# Patient Record
Sex: MALE | Employment: FULL TIME | URBAN - METROPOLITAN AREA
[De-identification: names, ages, dates, MRNs, and addresses within clinical notes are randomized per-mention and may not be internally consistent; named-entity substitution may affect disease eponyms.]

---

## 2017-01-25 ENCOUNTER — HOSPITAL ENCOUNTER (OUTPATIENT)
Dept: GENERAL RADIOLOGY | Age: 22
Discharge: HOME | End: 2017-01-25

## 2017-01-25 DIAGNOSIS — M54.50 LOW BACK PAIN: ICD-10-CM

## 2017-01-25 PROCEDURE — 72080 X-RAY EXAM THORACOLMB 2/> VW: CPT

## 2018-09-29 ENCOUNTER — APPOINTMENT (OUTPATIENT)
Dept: CT IMAGING | Age: 23
DRG: 103 | End: 2018-09-29
Attending: INTERNAL MEDICINE
Payer: COMMERCIAL

## 2018-09-29 ENCOUNTER — HOSPITAL ENCOUNTER (INPATIENT)
Age: 23
LOS: 1 days | Discharge: HOME | DRG: 103 | End: 2018-09-30
Attending: STUDENT IN AN ORGANIZED HEALTH CARE EDUCATION/TRAINING PROGRAM | Admitting: INTERNAL MEDICINE
Payer: COMMERCIAL

## 2018-09-29 ENCOUNTER — APPOINTMENT (OUTPATIENT)
Dept: MRI IMAGING | Age: 23
DRG: 103 | End: 2018-09-29
Attending: INTERNAL MEDICINE
Payer: COMMERCIAL

## 2018-09-29 ENCOUNTER — APPOINTMENT (OUTPATIENT)
Dept: CT IMAGING | Age: 23
DRG: 103 | End: 2018-09-29
Attending: STUDENT IN AN ORGANIZED HEALTH CARE EDUCATION/TRAINING PROGRAM
Payer: COMMERCIAL

## 2018-09-29 DIAGNOSIS — G45.9 TRANSIENT CEREBRAL ISCHEMIA, UNSPECIFIED TYPE: Primary | ICD-10-CM

## 2018-09-29 DIAGNOSIS — G11.0: ICD-10-CM

## 2018-09-29 DIAGNOSIS — R53.1 WEAKNESS: ICD-10-CM

## 2018-09-29 DIAGNOSIS — R29.810 FACIAL WEAKNESS: ICD-10-CM

## 2018-09-29 DIAGNOSIS — R20.0 LEFT SIDED NUMBNESS: ICD-10-CM

## 2018-09-29 DIAGNOSIS — G43.109 COMPLICATED MIGRAINE: ICD-10-CM

## 2018-09-29 LAB
ALBUMIN SERPL-MCNC: 4.5 G/DL (ref 3.5–5)
ALBUMIN/GLOB SERPL: 1.2 {RATIO} (ref 1.1–2.2)
ALP SERPL-CCNC: 78 U/L (ref 45–117)
ALT SERPL-CCNC: 33 U/L (ref 12–78)
ANION GAP SERPL CALC-SCNC: 7 MMOL/L (ref 5–15)
AST SERPL-CCNC: 18 U/L (ref 15–37)
ATRIAL RATE: 88 BPM
BASOPHILS # BLD: 0 K/UL (ref 0–0.1)
BASOPHILS NFR BLD: 0 % (ref 0–1)
BILIRUB SERPL-MCNC: 0.6 MG/DL (ref 0.2–1)
BUN SERPL-MCNC: 16 MG/DL (ref 6–20)
BUN/CREAT SERPL: 17 (ref 12–20)
CALCIUM SERPL-MCNC: 8.9 MG/DL (ref 8.5–10.1)
CALCULATED P AXIS, ECG09: 49 DEGREES
CALCULATED R AXIS, ECG10: 84 DEGREES
CALCULATED T AXIS, ECG11: 43 DEGREES
CHLORIDE SERPL-SCNC: 106 MMOL/L (ref 97–108)
CO2 SERPL-SCNC: 29 MMOL/L (ref 21–32)
COMMENT, HOLDF: NORMAL
CREAT SERPL-MCNC: 0.95 MG/DL (ref 0.7–1.3)
DIAGNOSIS, 93000: NORMAL
DIFFERENTIAL METHOD BLD: ABNORMAL
EOSINOPHIL # BLD: 0.2 K/UL (ref 0–0.4)
EOSINOPHIL NFR BLD: 4 % (ref 0–7)
ERYTHROCYTE [DISTWIDTH] IN BLOOD BY AUTOMATED COUNT: 11.9 % (ref 11.5–14.5)
GLOBULIN SER CALC-MCNC: 3.7 G/DL (ref 2–4)
GLUCOSE BLD STRIP.AUTO-MCNC: 106 MG/DL (ref 65–100)
GLUCOSE SERPL-MCNC: 103 MG/DL (ref 65–100)
HCT VFR BLD AUTO: 45.4 % (ref 36.6–50.3)
HGB BLD-MCNC: 15.5 G/DL (ref 12.1–17)
IMM GRANULOCYTES # BLD: 0 K/UL (ref 0–0.04)
IMM GRANULOCYTES NFR BLD AUTO: 1 % (ref 0–0.5)
INR PPP: 1 (ref 0.9–1.1)
LYMPHOCYTES # BLD: 1.5 K/UL (ref 0.8–3.5)
LYMPHOCYTES NFR BLD: 26 % (ref 12–49)
MCH RBC QN AUTO: 29.9 PG (ref 26–34)
MCHC RBC AUTO-ENTMCNC: 34.1 G/DL (ref 30–36.5)
MCV RBC AUTO: 87.5 FL (ref 80–99)
MONOCYTES # BLD: 0.6 K/UL (ref 0–1)
MONOCYTES NFR BLD: 11 % (ref 5–13)
NEUTS SEG # BLD: 3.3 K/UL (ref 1.8–8)
NEUTS SEG NFR BLD: 59 % (ref 32–75)
NRBC # BLD: 0 K/UL (ref 0–0.01)
NRBC BLD-RTO: 0 PER 100 WBC
P-R INTERVAL, ECG05: 108 MS
PLATELET # BLD AUTO: 219 K/UL (ref 150–400)
PMV BLD AUTO: 9.2 FL (ref 8.9–12.9)
POTASSIUM SERPL-SCNC: 3.7 MMOL/L (ref 3.5–5.1)
PROT SERPL-MCNC: 8.2 G/DL (ref 6.4–8.2)
PROTHROMBIN TIME: 9.8 SEC (ref 9–11.1)
Q-T INTERVAL, ECG07: 330 MS
QRS DURATION, ECG06: 104 MS
QTC CALCULATION (BEZET), ECG08: 399 MS
RBC # BLD AUTO: 5.19 M/UL (ref 4.1–5.7)
SAMPLES BEING HELD,HOLD: NORMAL
SERVICE CMNT-IMP: ABNORMAL
SODIUM SERPL-SCNC: 142 MMOL/L (ref 136–145)
VENTRICULAR RATE, ECG03: 88 BPM
WBC # BLD AUTO: 5.7 K/UL (ref 4.1–11.1)

## 2018-09-29 PROCEDURE — 74011636320 HC RX REV CODE- 636/320: Performed by: INTERNAL MEDICINE

## 2018-09-29 PROCEDURE — 82962 GLUCOSE BLOOD TEST: CPT

## 2018-09-29 PROCEDURE — 80053 COMPREHEN METABOLIC PANEL: CPT | Performed by: STUDENT IN AN ORGANIZED HEALTH CARE EDUCATION/TRAINING PROGRAM

## 2018-09-29 PROCEDURE — 95816 EEG AWAKE AND DROWSY: CPT | Performed by: INTERNAL MEDICINE

## 2018-09-29 PROCEDURE — 93005 ELECTROCARDIOGRAM TRACING: CPT

## 2018-09-29 PROCEDURE — 99285 EMERGENCY DEPT VISIT HI MDM: CPT

## 2018-09-29 PROCEDURE — 70551 MRI BRAIN STEM W/O DYE: CPT

## 2018-09-29 PROCEDURE — 85025 COMPLETE CBC W/AUTO DIFF WBC: CPT | Performed by: STUDENT IN AN ORGANIZED HEALTH CARE EDUCATION/TRAINING PROGRAM

## 2018-09-29 PROCEDURE — 70460 CT HEAD/BRAIN W/DYE: CPT

## 2018-09-29 PROCEDURE — 70544 MR ANGIOGRAPHY HEAD W/O DYE: CPT

## 2018-09-29 PROCEDURE — 85610 PROTHROMBIN TIME: CPT | Performed by: STUDENT IN AN ORGANIZED HEALTH CARE EDUCATION/TRAINING PROGRAM

## 2018-09-29 PROCEDURE — 65660000000 HC RM CCU STEPDOWN

## 2018-09-29 PROCEDURE — 36415 COLL VENOUS BLD VENIPUNCTURE: CPT | Performed by: STUDENT IN AN ORGANIZED HEALTH CARE EDUCATION/TRAINING PROGRAM

## 2018-09-29 PROCEDURE — 70450 CT HEAD/BRAIN W/O DYE: CPT

## 2018-09-29 PROCEDURE — 74011000258 HC RX REV CODE- 258: Performed by: INTERNAL MEDICINE

## 2018-09-29 RX ORDER — SODIUM CHLORIDE 0.9 % (FLUSH) 0.9 %
5-10 SYRINGE (ML) INJECTION EVERY 8 HOURS
Status: DISCONTINUED | OUTPATIENT
Start: 2018-09-29 | End: 2018-09-30 | Stop reason: HOSPADM

## 2018-09-29 RX ORDER — SODIUM CHLORIDE 0.9 % (FLUSH) 0.9 %
10 SYRINGE (ML) INJECTION
Status: COMPLETED | OUTPATIENT
Start: 2018-09-29 | End: 2018-09-29

## 2018-09-29 RX ORDER — SODIUM CHLORIDE 0.9 % (FLUSH) 0.9 %
5-10 SYRINGE (ML) INJECTION AS NEEDED
Status: DISCONTINUED | OUTPATIENT
Start: 2018-09-29 | End: 2018-09-30 | Stop reason: HOSPADM

## 2018-09-29 RX ORDER — ONDANSETRON 4 MG/1
4 TABLET, ORALLY DISINTEGRATING ORAL
Status: DISCONTINUED | OUTPATIENT
Start: 2018-09-29 | End: 2018-09-30 | Stop reason: HOSPADM

## 2018-09-29 RX ORDER — DIAZEPAM 5 MG/1
10 TABLET ORAL
Status: DISCONTINUED | OUTPATIENT
Start: 2018-09-29 | End: 2018-09-30 | Stop reason: HOSPADM

## 2018-09-29 RX ORDER — ENOXAPARIN SODIUM 100 MG/ML
40 INJECTION SUBCUTANEOUS EVERY 24 HOURS
Status: DISCONTINUED | OUTPATIENT
Start: 2018-09-29 | End: 2018-09-30 | Stop reason: HOSPADM

## 2018-09-29 RX ADMIN — Medication 10 ML: at 16:00

## 2018-09-29 RX ADMIN — Medication 10 ML: at 19:23

## 2018-09-29 RX ADMIN — IOPAMIDOL 100 ML: 612 INJECTION, SOLUTION INTRAVENOUS at 19:23

## 2018-09-29 RX ADMIN — SODIUM CHLORIDE 100 ML: 900 INJECTION, SOLUTION INTRAVENOUS at 19:23

## 2018-09-29 NOTE — PROGRESS NOTES
Admission Medication Reconciliation: 
 
Information obtained from:  Patient Updated PTA meds/reviewed patient's allergies. Patient denies taking any prescription medications, just occasion ibuprofen as needed for headaches. Patient reported smoking THC daily and admits to drinking 6 beers a day. Allergies:  Review of patient's allergies indicates no known allergies. Significant PMH/Disease States:  
Past Medical History:  
Diagnosis Date  Alcohol use  Cerebral palsy ([facility] [address]  Depression  GI bleed Chief Complaint for this Admission: Chief Complaint Patient presents with  Fatigue  Dizziness Prior to Admission Medications:  
None

## 2018-09-29 NOTE — ED NOTES
1455: Report attempted x1, spoke to [employer]. 1500: TRANSFER - OUT REPORT: 
 
Verbal report given to TAMIKO Peter(name) on Aj Dunlap  being transferred to NEURO(unit) for routine progression of care Report consisted of patients Situation, Background, Assessment and  
Recommendations(SBAR). Information from the following report(s) SBAR, ED Summary, Intake/Output, MAR and Recent Results was reviewed with the receiving nurse. Lines:  
Peripheral IV 09/29/18 Left Antecubital (Active) Site Assessment Clean, dry, & intact 9/29/2018 10:23 AM  
Phlebitis Assessment 0 9/29/2018 10:23 AM  
Infiltration Assessment 0 9/29/2018 10:23 AM  
Dressing Status Clean, dry, & intact 9/29/2018 10:23 AM  
  
 
Opportunity for questions and clarification was provided. Patient transported with: 
 [employer]

## 2018-09-29 NOTE — PROGRESS NOTES
TRANSFER - IN REPORT: 
 
Verbal report received from Beni Matamoros (name) on Abelardo Cluster  being received from ED(unit) for routine progression of care Report consisted of patients Situation, Background, Assessment and  
Recommendations(SBAR). Information from the following report(s) SBAR, Kardex, [facility] and Cardiac Rhythm NSR was reviewed with the receiving nurse. Opportunity for questions and clarification was provided. Assessment completed upon patients arrival to unit and care assumed.

## 2018-09-29 NOTE — ED PROVIDER NOTES
HPI Comments: [place] y.o. male with past medical history significant for cerebral palsy who presents from home with chief complaint of numbness. Patient states that he developed left-sided facial numbness and droop as well as left upper extremity and left lower extremity numbness 16 hours ago (1700 yesterday evening). He complains of associated headache last night as well. Patient notes that he was unable to move this morning and had difficulty \"getting out of bed\" and \"putting his shoes on. \"  He notes that he previously had similar symptoms of numbness before in 7th grade. He comments that his symptoms have currently resolved. Patient denies fever, chills, nausea, vomiting, abdominal pain, chest pain, and diarrhea. There are no other acute medical concerns at this time. Social hx: unknown tobacco, alcohol, and drug use PCP: Suni Guerra MD 
 
Note written by Anthony Ceballos, as dictated by Shana Crain MD 9:12 AM 
 
 
 
 
The history is provided by the patient. No  was used. History reviewed. No pertinent past medical history. History reviewed. No pertinent surgical history. History reviewed. No pertinent family history. Social History Social History  Marital status: UNKNOWN Spouse name: N/A  
 Number of children: N/A  
 Years of education: N/A Occupational History  Not on file. Social History Main Topics  Smoking status: Not on file  Smokeless tobacco: Not on file  Alcohol use Not on file  Drug use: Not on file  Sexual activity: Not on file Other Topics Concern  Not on file Social History Narrative  No narrative on file ALLERGIES: Review of patient's allergies indicates no known allergies. Review of Systems Constitutional: Negative for chills, diaphoresis, fatigue and fever.   
HENT: Negative for congestion, rhinorrhea, sinus pressure, sore throat, trouble swallowing and voice change. Eyes: Negative for photophobia and visual disturbance. Respiratory: Negative for cough, chest tightness and shortness of breath. Cardiovascular: Negative for chest pain, palpitations and leg swelling. Gastrointestinal: Negative for abdominal pain, blood in stool, constipation, diarrhea, nausea and vomiting. Musculoskeletal: Negative for arthralgias, myalgias and neck pain. Neurological: Positive for facial asymmetry, numbness and headaches. Negative for dizziness, weakness and light-headedness. All other systems reviewed and are negative. Vitals:  
 09/29/18 9105 BP: 152/89 Pulse: 100 Resp: 16 Temp: 97.4 °F (36.3 °C) SpO2: 100% Height: 5' 11\" (1.803 m) Physical Exam  
 
MDM Number of Diagnoses or Management Options Transient cerebral ischemia, unspecified type:  
Diagnosis management comments: A/P:  TIA. Will admit for further eval. 
 
  
Amount and/or Complexity of Data Reviewed Clinical lab tests: ordered and reviewed Tests in the radiology section of CPT®: ordered and reviewed Discuss the patient with other providers: yes Independent visualization of images, tracings, or specimens: yes Risk of Complications, Morbidity, and/or Mortality Presenting problems: moderate Diagnostic procedures: moderate Management options: moderate Critical Care Total time providing critical care: 30-74 minutes (Total critical care time spent exclusive of procedures:  35 min.) Patient Progress Patient progress: stable ED Course Procedures ED EKG interpretation: 
Rhythm: sinus rhythm; Rate (approx.): 88 bpm; ST/T wave: No ST or T wave abnormalities; Note written by Talon Contreras, as dictated by Makayla Mallory MD 9:24 AM 
 
CONSULT NOTE: 
9:32 Jagruti Murguia MD spoke with Dr. Shant Zhou, Consult for Tele-Neurology.   Discussed available diagnostic tests and clinical findings. Dr. Melchor Moon states that the patient had a complex migraine or partial seizure and was postictal.  He doubts TIA but notes that the patient still needs to come in for evaluation. CONSULT NOTE: 
10:53 AM Sudha Kohli MD communicated with Dr. Deandra Yu, Consult for Hospitalist via [facility] Text. Discussed available diagnostic tests and clinical findings. Awaiting response from Dr. Deandra Yu. CONSULT NOTE: 
11:43 AM Sudha Kohli MD spoke with Dr. Leticia Aranda, Consult for Hospitalist.  Discussed available diagnostic tests and clinical findings. Dr. Cata Vazquez will admit the patient to the hospital. 
 
4:32 PM 
Patient is being admitted to the hospital.  The results of their tests and reasons for their admission have been discussed with them and/or available family. They convey agreement and understanding for the need to be admitted and for their admission diagnosis. Consultation has been made with the inpatient physician specialist for hospitalization. LABORATORY TESTS: 
No results found for this or any previous visit (from the past 12 hour(s)). IMAGING RESULTS: 
MRV BRAIN WO CONT Final Result IMPRESSION:  Diminutive left transverse sinus with apparent filling defects. It  
is unclear whether this represents dural venous sinus thrombosis or a right  
dominant transverse sinus system. If there is persistent clinical concern for  
dural venous sinus thrombosis, contrast-enhanced CTV Head is recommended for  
further evaluation. 23X Henrry Sydnie CT HEAD W CONT Final Result CT CODE NEURO HEAD WO CONTRAST Final Result No results found. MEDICATIONS GIVEN: 
Medications  
iopamidol (ISOVUE 300) 61 % contrast injection 100 mL (100 mL IntraVENous Given 9/29/18 1923)  
sodium chloride 0.9 % bolus infusion 100 mL (100 mL IntraVENous New Bag 9/29/18 1923)  
sodium chloride (NS) flush 10 mL (10 mL IntraVENous Given 9/29/18 1923) IMPRESSION: 
 1. Transient cerebral ischemia, unspecified type 2. Weakness 3. Complicated migraine 4. Facial weakness 5. Left sided numbness 6. Congenital nonprogressive ataxia ([facility] [address] PLAN: 
1. Admit to Hospitalist 
 
Total critical care time spent exclusive of procedures:  35 minutes Yuli Harris MD

## 2018-09-29 NOTE — H&P
History and Physical 
Primary Care Provider: Fabienne Dillard MD 
 
Subjective:  
 
Apolonia Julian is a 21 y.o. male  with spastic CP, h/o UGIB, depression, and alcohol abuse who presents with acute onset L sided weakness. He states he was playing video games ysterday evening when he suddenly felt a L facial droop, and LUE weakness. He ignored the symtoms and continued playing. Later that evening he began having a headache with photophobia and phonophobia. His headache and weakness have now resolved. 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 After he told her how he was feeling last night, she told him to come to the ED for evaluation. Per the patient he had a similar event occur when he was in the seventh grade, and was told he had a complex migraine. He does not take any medication. He should walk with a walker but doesn't like too, and states he has been falling and knows he probably should. Denies any visual changes, no hearing changes, no HA currently, no trouble swallowing, no N/V/D/C, no abdominal pain, no MSK pain, no weakness currently, no numbness or tingling. Drinks about 6 beers per day, and does state he has withdrawal symptoms. Last drink was 2 nights ago. Review of Systems: Further 10 point review of systems is benign. A comprehensive review of systems was negative except for that written in the History of Present Illness. Past Medical History:  
Diagnosis Date  Alcohol use  Cerebral palsy ([facility] [address]  Depression  GI bleed Past Surgical History:  
Procedure Laterality Date  HX TONSILLECTOMY Medications: no medications No Known Allergies History reviewed. No pertinent family history. SOCIAL HISTORY: 
Patient resides at Home. Patient ambulates with walker, but chooses not to use it. Smoking history: 1 pack per week of cigarettes Alcohol history: 6 beers per day, per patient has had withdrawal in the past. 
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  
 
 
Physical Exam:  
Visit Vitals  /57 (BP 1 Location: Right arm, BP Patient Position: At rest)  Pulse 84  Temp 97.4 °F (36.3 °C)  Resp 21  
 Ht 5' 11\" (1.803 m)  SpO2 96% General appearance: alert, cooperative, no distress, appears stated age Eyes: conjunctivae/corneas clear. PERRL, EOM's intact.+ bilateral horizontal nystagmus Throat: Lips, mucosa, and tongue normal. Poor dentition Neck: supple, symmetrical, trachea midline, no adenopathy and no carotid bruit Lungs: clear to auscultation bilaterally Heart: regular rate and rhythm, S1, S2 normal, no murmur, click, rub or gallop Abdomen: soft, non-tender. Bowel sounds normal. No masses,  no organomegaly Extremities: extremities normal, atraumatic, no cyanosis or edema Pulses: 2+ and symmetric Skin: Skin color, texture, turgor normal. No rashes or lesions ECG:  normal EKG, normal sinus rhythm Data Review: All diagnostic labs and studies have been reviewed. CT head - no acute abnormalities Assessment:  
 
Active Problems: 
  Weakness (9/29/2018) Plan:  
Weakness and Headache - consistent with complex migraine headache. Has now resolved. H/O CP does put the patient at risk for seizure disorder. Unlikely to be TIA 
- Neurology consult - MRA/MRI, EEG  
- Given episodes are very infrequent, likely does not need any controller medications for headache 
- Consider rescue triptan for future. Spastic CP - with frequent falls - PT 
 
H/o acohol abuse 
- CIWA 
- Diazepam PRN  
 
DVT PPx - lovenox Regular diet Dispo: pending workup as above. Signed By: Tatiana Rivear MD   
 September 29, 2018

## 2018-09-29 NOTE — PROGRESS NOTES
Bedside and Verbal shift change report given to Harsh Khan (oncoming nurse) by Lakshmi Angel (offgoing nurse). Report included the following information SBAR, Kardex, MAR and Cardiac Rhythm NSR.

## 2018-09-29 NOTE — IP AVS SNAPSHOT
[address] [facility] [address] 
[phone] Patient: Stephanie Oropeza MRN: [id number] KUQ:[birth year] About your hospitalization You were admitted on:  September 29, 2018 You last received care in the:  [facility] 6S NEURO-SCI TELE You were discharged on:  September 30, 2018 Why you were hospitalized Your primary diagnosis was:  Not on File Your diagnoses also included:  Weakness Follow-up Information Follow up With Details Comments Contact Info Jacquie Stoddard MD Schedule an appointment as soon as possible for a visit  [facility] [address] [place] [address] 
[phone] Efrain Jimenez MD Schedule an appointment as soon as possible for a visit  [address] [facility] [address] [facility] [address] 
[phone] Discharge Orders None A check shailesh indicates which time of day the medication should be taken. My Medications START taking these medications Instructions Each Dose to Equal  
 Morning Noon Evening Bedtime  
 aspirin 81 mg chewable tablet Start taking on:  10/1/2018 Your last dose was: Your next dose is: Take 1 Tab by mouth daily. 81 mg  
    
   
   
   
  
 verapamil  mg tablet Commonly known as:  CALAN-SR Your last dose was: Your next dose is: Take 1 Tab by mouth daily. 120 mg Where to Get Your Medications Information on where to get these meds will be given to you by the nurse or doctor. ! Ask your nurse or doctor about these medications  
  aspirin 81 mg chewable tablet  
 verapamil  mg tablet Discharge Instructions Migraine Headache: Care Instructions Your Care Instructions Migraines are painful, throbbing headaches that often start on one side of the head. They may cause nausea and vomiting and make you sensitive to light, sound, or smell. Without treatment, migraines can last from 4 hours to a few days. Medicines can help prevent migraines or stop them after they have started. Your doctor can help you find which ones work best for you. Follow-up care is a key part of your treatment and safety. Be sure to make and go to all appointments, and call your doctor if you are having problems. It's also a good idea to know your test results and keep a list of the medicines you take. How can you care for yourself at home? · Do not drive if you have taken a prescription pain medicine. · Rest in a quiet, dark room until your headache is gone. Close your eyes, and try to relax or go to sleep. Don't watch TV or read. · Put a cold, moist cloth or cold pack on the painful area for 10 to 20 minutes at a time. Put a thin cloth between the cold pack and your skin. · Use a warm, moist towel or a heating pad set on low to relax tight shoulder and neck muscles. · Have someone gently massage your neck and shoulders. · Take your medicines exactly as prescribed. Call your doctor if you think you are having a problem with your medicine. You will get more details on the specific medicines your doctor prescribes. · Be careful not to take pain medicine more often than the instructions allow. You could get worse or more frequent headaches when the medicine wears off. To prevent migraines · Keep a headache diary so you can figure out what triggers your headaches. Avoiding triggers may help you prevent headaches. Record when each headache began, how long it lasted, and what the pain was like. (Was it throbbing, aching, stabbing, or dull?) Write down any other symptoms you had with the headache, such as nausea, flashing lights or dark spots, or sensitivity to bright light or loud noise. Note if the headache occurred near your period. List anything that might have triggered the headache.  Triggers may include certain foods (chocolate, cheese, wine) or odors, smoke, bright light, stress, or lack of sleep. · If your doctor has prescribed medicine for your migraines, take it as directed. You may have medicine that you take only when you get a migraine and medicine that you take all the time to help prevent migraines. ¨ If your doctor has prescribed medicine for when you get a headache, take it at the first sign of a migraine, unless your doctor has given you other instructions. ¨ If your doctor has prescribed medicine to prevent migraines, take it exactly as prescribed. Call your doctor if you think you are having a problem with your medicine. · Find healthy ways to deal with stress. Migraines are most common during or right after stressful times. Take time to relax before and after you do something that has caused a migraine in the past. 
· Try to keep your muscles relaxed by keeping good posture. Check your jaw, face, neck, and shoulder muscles for tension. Try to relax them. When you sit at a desk, change positions often. And make sure to stretch for 30 seconds each hour. · Get plenty of sleep and exercise. · Eat meals on a regular schedule. Avoid foods and drinks that often trigger migraines. These include chocolate, alcohol (especially red wine and port), aspartame, monosodium glutamate (MSG), and some additives found in foods (such as hot dogs, ferris, cold cuts, aged cheeses, and pickled foods). · Limit caffeine. Don't drink too much coffee, tea, or soda. But don't quit caffeine suddenly. That can also give you migraines. · Do not smoke or allow others to smoke around you. If you need help quitting, talk to your doctor about stop-smoking programs and medicines. These can increase your chances of quitting for good. · If you are taking birth control pills or hormone therapy, talk to your doctor about whether they are triggering your migraines. When should you call for help? Call 911 anytime you think you may need emergency care. For example, call if:    · You have signs of a stroke. These may include: 
¨ Sudden numbness, paralysis, or weakness in your face, arm, or leg, especially on only one side of your body. ¨ Sudden vision changes. ¨ Sudden trouble speaking. ¨ Sudden confusion or trouble understanding simple statements. ¨ Sudden problems with walking or balance. ¨ A sudden, severe headache that is different from past headaches.  
 Call your doctor now or seek immediate medical care if: 
   · You have new or worse nausea and vomiting.  
   · You have a new or higher fever.  
   · Your headache gets much worse.  
 Watch closely for changes in your health, and be sure to contact your doctor if: 
   · You are not getting better after 2 days (48 hours). Where can you learn more? Go to http://manuela-santiago.info/. Enter G566 in the search box to learn more about \"Migraine Headache: Care Instructions. \" Current as of: October 9, 2017 Content Version: 11.7 © 8089-1111 [employer] Care instructions adapted under license by [employer] (which disclaims liability or warranty for this information). If you have questions about a medical condition or this instruction, always ask your healthcare professional. [place] [address] any warranty or liability for your use of this information. 
  
 
 
  
  
  
Introducing [facility]! [facility] introduces Ampl[employer] patient portal. Now you can access parts of your medical record, email your doctor's office, and request medication refills online. 1. In your internet browser, go to [email or web address] 2. Click on the First Time User? Click Here link in the Sign In box. You will see the New Member Sign Up page. 3. Enter your [employer] Access Code exactly as it appears below. You will not need to use this code after youve completed the sign-up process. If you do not sign up before the expiration date, you must request a new code. · Spowit Access Code: [email or web address] Expires: 12/28/2018  9:03 AM 
 
4. Enter the last four digits of your Social Security Number (xxxx) and Date of Birth (mm/dd/yyyy) as indicated and click Submit. You will be taken to the next sign-up page. 5. Create a Spowit ID. This will be your Spowit login ID and cannot be changed, so think of one that is secure and easy to remember. 6. Create a Spowit password. You can change your password at any time. 7. Enter your Password Reset Question and Answer. This can be used at a later time if you forget your password. 8. Enter your e-mail address. You will receive e-mail notification when new information is available in [address] 9. Click Sign Up. You can now view and download portions of your medical record. 10. Click the Download Summary menu link to download a portable copy of your medical information. If you have questions, please visit the Frequently Asked Questions section of the Spowit website. Remember, Spowit is NOT to be used for urgent needs. For medical emergencies, dial 911. Now available from your iPhone and Android! Introducing Burke Huizar As a [facility] patient, I wanted to make you aware of our electronic visit tool called Burke Vicолег. [facility] 24/7 allows you to connect within minutes with a medical provider 24 hours a day, seven days a week via a mobile device or tablet or logging into a secure website from your computer. You can access Burke Gaye from anywhere in the [place]. A virtual visit might be right for you when you have a simple condition and feel like you just dont want to get out of bed, or cant get away from work for an appointment, when your regular [facility] provider is not available (evenings, weekends or holidays), or when youre out of town and need minor care.   Electronic visits cost only $49 and if the [facility] 24/7 provider determines a prescription is needed to treat your condition, one can be electronically transmitted to a nearby pharmacy*. Please take a moment to enroll today if you have not already done so. The enrollment process is free and takes just a few minutes. To enroll, please download the CME 24/7 soledad to your tablet or phone, or visit [email or web address] to enroll on your computer. And, as an [address] [place] [address] patient with a [employer] account, the results of your visits will be scanned into your electronic medical record and your primary care provider will be able to view the scanned results. We urge you to continue to see your regular CME provider for your ongoing medical care. And while your primary care provider may not be the one available when you seek a [employer] virtual visit, the peace of mind you get from getting a real diagnosis real time can be priceless. For more information on [employer]utenberg[employer], view our Frequently Asked Questions (FAQs) at [email or web address]. Sincerely, 
 
Susana Lopez MD 
Chief Medical Officer [facility] Shayna Smalls *:  certain medications cannot be prescribed via Gutenbergz Providers Seen During Your Hospitalization Provider Specialty Primary office phone Pedro Chaves MD Emergency Medicine [phone] Jennifer Olmos MD Hospitalist [phone] Crystal Espinoza MD Hospitalist [phone] Immunizations Administered for This Admission Name Date Influenza Vaccine (Quad) PF  Deferred () Your Primary Care Physician (PCP) Primary Care Physician Office Phone Office Fax Amy Hensley [phone] You are allergic to the following No active allergies Recent Documentation Height Weight BMI  
  
  
 1.803 m 74.4 kg 22.87 kg/m2 Emergency Contacts Name Discharge Info Relation Home Work Mobile Arron Fees CAREGIVER [3] Mother [14] [phone] Patient Belongings The following personal items are in your possession at time of discharge: 
  Dental Appliances: None         Home Medications: None   Jewelry: None  Clothing: At bedside    Other Valuables: Cell Phone, Keli Barrera Please provide this summary of care documentation to your next provider. Signatures-by signing, you are acknowledging that this After Visit Summary has been reviewed with you and you have received a copy. Patient Signature:  ____________________________________________________________ Date:  ____________________________________________________________  
  
[facility] Provider Signature:  ____________________________________________________________ Date:  ____________________________________________________________

## 2018-09-29 NOTE — PROGRESS NOTES
Problem: Falls - Risk of 
Goal: *Absence of Falls Document Adriana Betancur Fall Risk and appropriate interventions in the flowsheet. Outcome: Progressing Towards Goal 
Fall Risk Interventions: 
Mobility Interventions: Patient to call before getting OOB

## 2018-09-29 NOTE — ED TRIAGE NOTES
Pt reports at 65 yesterday he began with left facial numbness, left facial droop, left arm and leg numbness. Pt also states his speech was slurred. Symptoms have resolved except pt feels like his speech is still slow. 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 Blood sugar 106 in triage.

## 2018-09-29 NOTE — IP AVS SNAPSHOT
[address] 
[phone] Patient: Pam Young MRN: [id number] FAY:[id number] A check shailesh indicates which time of day the medication should be taken. My Medications START taking these medications Instructions Each Dose to Equal  
 Morning Noon Evening Bedtime  
 aspirin 81 mg chewable tablet Start taking on:  10/1/2018 Your last dose was: Your next dose is: Take 1 Tab by mouth daily. 81 mg  
    
   
   
   
  
 verapamil  mg tablet Commonly known as:  CALAN-SR Your last dose was: Your next dose is: Take 1 Tab by mouth daily. 120 mg Where to Get Your Medications Information on where to get these meds will be given to you by the nurse or doctor. ! Ask your nurse or doctor about these medications  
  aspirin 81 mg chewable tablet  
 verapamil  mg tablet

## 2018-09-30 VITALS
DIASTOLIC BLOOD PRESSURE: 60 MMHG | WEIGHT: 164 LBS | SYSTOLIC BLOOD PRESSURE: 103 MMHG | BODY MASS INDEX: 22.96 KG/M2 | OXYGEN SATURATION: 97 % | RESPIRATION RATE: 15 BRPM | HEIGHT: 71 IN | TEMPERATURE: 97.9 F | HEART RATE: 86 BPM

## 2018-09-30 PROBLEM — R53.1 WEAKNESS: Status: RESOLVED | Noted: 2018-09-29 | Resolved: 2018-09-30

## 2018-09-30 LAB
ANION GAP SERPL CALC-SCNC: 11 MMOL/L (ref 5–15)
BUN SERPL-MCNC: 12 MG/DL (ref 6–20)
BUN/CREAT SERPL: 15 (ref 12–20)
CALCIUM SERPL-MCNC: 8.6 MG/DL (ref 8.5–10.1)
CHLORIDE SERPL-SCNC: 104 MMOL/L (ref 97–108)
CO2 SERPL-SCNC: 25 MMOL/L (ref 21–32)
CREAT SERPL-MCNC: 0.82 MG/DL (ref 0.7–1.3)
GLUCOSE SERPL-MCNC: 99 MG/DL (ref 65–100)
MAGNESIUM SERPL-MCNC: 2 MG/DL (ref 1.6–2.4)
PHOSPHATE SERPL-MCNC: 4 MG/DL (ref 2.6–4.7)
POTASSIUM SERPL-SCNC: 3.6 MMOL/L (ref 3.5–5.1)
SODIUM SERPL-SCNC: 140 MMOL/L (ref 136–145)

## 2018-09-30 PROCEDURE — G8979 MOBILITY GOAL STATUS: HCPCS

## 2018-09-30 PROCEDURE — G8978 MOBILITY CURRENT STATUS: HCPCS

## 2018-09-30 PROCEDURE — 97161 PT EVAL LOW COMPLEX 20 MIN: CPT

## 2018-09-30 PROCEDURE — 84100 ASSAY OF PHOSPHORUS: CPT | Performed by: INTERNAL MEDICINE

## 2018-09-30 PROCEDURE — 97112 NEUROMUSCULAR REEDUCATION: CPT

## 2018-09-30 PROCEDURE — 83735 ASSAY OF MAGNESIUM: CPT | Performed by: INTERNAL MEDICINE

## 2018-09-30 PROCEDURE — 36415 COLL VENOUS BLD VENIPUNCTURE: CPT | Performed by: INTERNAL MEDICINE

## 2018-09-30 PROCEDURE — 80048 BASIC METABOLIC PNL TOTAL CA: CPT | Performed by: INTERNAL MEDICINE

## 2018-09-30 RX ORDER — VERAPAMIL HYDROCHLORIDE 120 MG/1
120 TABLET, FILM COATED, EXTENDED RELEASE ORAL DAILY
Qty: 30 TAB | Refills: 0 | Status: SHIPPED | OUTPATIENT
Start: 2018-09-30 | End: 2019-06-20

## 2018-09-30 RX ORDER — GUAIFENESIN 100 MG/5ML
81 LIQUID (ML) ORAL DAILY
Qty: 30 TAB | Refills: 0 | Status: SHIPPED | OUTPATIENT
Start: 2018-10-01 | End: 2019-06-20

## 2018-09-30 RX ORDER — GUAIFENESIN 100 MG/5ML
81 LIQUID (ML) ORAL DAILY
Status: DISCONTINUED | OUTPATIENT
Start: 2018-10-01 | End: 2018-09-30 | Stop reason: HOSPADM

## 2018-09-30 RX ORDER — VERAPAMIL HYDROCHLORIDE 240 MG/1
120 TABLET, FILM COATED, EXTENDED RELEASE ORAL DAILY
Status: DISCONTINUED | OUTPATIENT
Start: 2018-09-30 | End: 2018-09-30 | Stop reason: HOSPADM

## 2018-09-30 NOTE — PROGRESS NOTES
Problem: Falls - Risk of 
Goal: *Absence of Falls Document Roman Zazueta Fall Risk and appropriate interventions in the flowsheet. Outcome: Progressing Towards Goal 
Fall Risk Interventions: 
Mobility Interventions: Patient to call before getting OOB Medication Interventions: Patient to call before getting OOB

## 2018-09-30 NOTE — DISCHARGE INSTRUCTIONS
Migraine Headache: Care Instructions  Your Care Instructions  Migraines are painful, throbbing headaches that often start on one side of the head. They may cause nausea and vomiting and make you sensitive to light, sound, or smell. Without treatment, migraines can last from 4 hours to a few days. Medicines can help prevent migraines or stop them after they have started. Your doctor can help you find which ones work best for you. Follow-up care is a key part of your treatment and safety. Be sure to make and go to all appointments, and call your doctor if you are having problems. It's also a good idea to know your test results and keep a list of the medicines you take. How can you care for yourself at home? · Do not drive if you have taken a prescription pain medicine. · Rest in a quiet, dark room until your headache is gone. Close your eyes, and try to relax or go to sleep. Don't watch TV or read. · Put a cold, moist cloth or cold pack on the painful area for 10 to 20 minutes at a time. Put a thin cloth between the cold pack and your skin. · Use a warm, moist towel or a heating pad set on low to relax tight shoulder and neck muscles. · Have someone gently massage your neck and shoulders. · Take your medicines exactly as prescribed. Call your doctor if you think you are having a problem with your medicine. You will get more details on the specific medicines your doctor prescribes. · Be careful not to take pain medicine more often than the instructions allow. You could get worse or more frequent headaches when the medicine wears off. To prevent migraines  · Keep a headache diary so you can figure out what triggers your headaches. Avoiding triggers may help you prevent headaches. Record when each headache began, how long it lasted, and what the pain was like.  (Was it throbbing, aching, stabbing, or dull?) Write down any other symptoms you had with the headache, such as nausea, flashing lights or dark spots, or sensitivity to bright light or loud noise. Note if the headache occurred near your period. List anything that might have triggered the headache. Triggers may include certain foods (chocolate, cheese, wine) or odors, smoke, bright light, stress, or lack of sleep. · If your doctor has prescribed medicine for your migraines, take it as directed. You may have medicine that you take only when you get a migraine and medicine that you take all the time to help prevent migraines. ¨ If your doctor has prescribed medicine for when you get a headache, take it at the first sign of a migraine, unless your doctor has given you other instructions. ¨ If your doctor has prescribed medicine to prevent migraines, take it exactly as prescribed. Call your doctor if you think you are having a problem with your medicine. · Find healthy ways to deal with stress. Migraines are most common during or right after stressful times. Take time to relax before and after you do something that has caused a migraine in the past.  · Try to keep your muscles relaxed by keeping good posture. Check your jaw, face, neck, and shoulder muscles for tension. Try to relax them. When you sit at a desk, change positions often. And make sure to stretch for 30 seconds each hour. · Get plenty of sleep and exercise. · Eat meals on a regular schedule. Avoid foods and drinks that often trigger migraines. These include chocolate, alcohol (especially red wine and port), aspartame, monosodium glutamate (MSG), and some additives found in foods (such as hot dogs, ferris, cold cuts, aged cheeses, and pickled foods). · Limit caffeine. Don't drink too much coffee, tea, or soda. But don't quit caffeine suddenly. That can also give you migraines. · Do not smoke or allow others to smoke around you. If you need help quitting, talk to your doctor about stop-smoking programs and medicines. These can increase your chances of quitting for good.   · If you are taking birth control pills or hormone therapy, talk to your doctor about whether they are triggering your migraines. When should you call for help? Call 911 anytime you think you may need emergency care. For example, call if:     · You have signs of a stroke. These may include:  ¨ Sudden numbness, paralysis, or weakness in your face, arm, or leg, especially on only one side of your body. ¨ Sudden vision changes. ¨ Sudden trouble speaking. ¨ Sudden confusion or trouble understanding simple statements. ¨ Sudden problems with walking or balance. ¨ A sudden, severe headache that is different from past headaches.    Call your doctor now or seek immediate medical care if:     · You have new or worse nausea and vomiting.      · You have a new or higher fever.      · Your headache gets much worse.    Watch closely for changes in your health, and be sure to contact your doctor if:     · You are not getting better after 2 days (48 hours). Where can you learn more? Go to http://manuela-santiago.info/. Enter Q627 in the search box to learn more about \"Migraine Headache: Care Instructions. \"  Current as of: October 9, 2017  Content Version: 11.7  © 3936-6151 [employer]. Care instructions adapted under license by [employer] (which disclaims liability or warranty for this information).  If you have questions about a medical condition or this instruction, always ask your healthcare professional. [address] any warranty or liability for your use of this information.

## 2018-09-30 NOTE — PROGRESS NOTES
Stroke Education documented in Patient Education: YES Core Measures Documented in [facility]: 
Risk Factors: YES Warning signs of stroke: YES When to Activate 911: YES Medication Education for Risk Factors: YES Smoking cessation if applicable: YES Written Education Given:  Kaleb Espinal Discharge NIH Completed: YES Score:0 
 
BRAINS: YES Follow Up Appointment Made: YES Date/Time if applicable: Bedside RN performed patient education and medication education. Discharge concerns initiated and discussed with patient, including clarification on \"who\" assists the patient at their home and instructions for when the home going patient should call their provider after discharge. Opportunity for questions and clarification was provided. Patient receptive to education: YES Patient stated: I will read this information when I get home and follow up with Dr. Keysha Cotter Barriers to Education: None Diagnosis Education given:  YES Length of stay: 1 Expected Day of Discharge: 9/30 Ask if they have \"Help at Home\" & add to white board? NO Education Day #: 2 Medication Education Given:  YES 
M in the box Medication name: Aspirin Pt aware of [facility] survey: YES

## 2018-09-30 NOTE — PROCEDURES
PROCEDURE: ROUTINE INPATIENT EEG  NAME:   Kevin Wisdom  ACCOUNT NUMBER : [de-identified]  MRN:   [id number]  DATE OF SERVICE: 9/29/18    HISTORY/INDICATION: Pt presenting with transient left sided numbness/weakness, the hospitalist ordered an EEG to evaluate for seizure as an etiology. MEDICATIONS:   Current Facility-Administered Medications   Medication Dose Route Frequency Provider Last Rate Last Dose    verapamil ER (CALAN-SR) tablet 120 mg  120 mg Oral DAILY Cassandra Palma MD        [START ON 10/1/2018] aspirin chewable tablet 81 mg  81 mg Oral DAILY Cassandra Palma MD        sodium chloride (NS) flush 5-10 mL  5-10 mL IntraVENous Q8H Arjun Gates MD   10 mL at 09/29/18 1600    sodium chloride (NS) flush 5-10 mL  5-10 mL IntraVENous PRN Arjun Gates MD        ondansetron (ZOFRAN ODT) tablet 4 mg  4 mg Oral Q4H PRN Arjun Gates MD        enoxaparin (LOVENOX) injection 40 mg  40 mg SubCUTAneous Q24H Arjun Gates MD        diazePAM (VALIUM) tablet 10 mg  10 mg Oral Q1H PRN Arjun Gates MD        influenza vaccine [id number] (6 mos+)(PF) (FLUARIX QUAD/FLULAVAL QUAD) injection 0.5 mL  0.5 mL IntraMUSCular PRIOR TO DISCHARGE Arjun Gates MD         Current Outpatient Prescriptions   Medication Sig Dispense Refill    [START ON 10/1/2018] aspirin 81 mg chewable tablet Take 1 Tab by mouth daily. 30 Tab 0    verapamil ER (CALAN-SR) 120 mg tablet Take 1 Tab by mouth daily. 30 Tab 0       CONDITIONS OF RECORDING: This is a routine 21-channel EEG recording performed in accordance with the international 10-20 system with one channel devoted to limited EKG. This study was done during a state of wakefulness. Photic stimulation was performed as an activating procedure. DESCRIPTION:   Upon maximal arousal the posterior dominant rhythm has a frequency of 9.5Hz with an amplitude of 40uV.  This activity is symmetric over the bilateral posterior derivations and attenuates with eye opening. Photic stimulation did not significantly alter the tracing. No sleep is seen. There are no focal abnormalities, epileptiform discharges, or electrographic seizures seen. INTERPRETATION: Normal awake EEG    CLINICAL CORRELATION: A normal EEG does not definitively exclude a diagnosis of epilepsy if clinical suspicion is high consider sleep deprived EEG.      Adelina Suarez MD

## 2018-09-30 NOTE — PROGRESS NOTES
1935 Entered patient's room for bedside reporting. Patient has pants on and putting shoes on. Requesting for IV to be removed, telemetry leads on floor. Patient states \"I need to leave. I have more crucial things to do\". When RN asked what was more important patient stated \"I need to sleep because I have to work tomorrow\". Day RN and night RN asked patient to stay the night for test results to be read and MD to review in morning. Patient doesn't believe anything will be found therefore, he wants to leave. This RN explained that something happened for him to come to hospital so stay the night to see what the MD finds. Patient agreeable at this point to stay the night.

## 2018-09-30 NOTE — DISCHARGE SUMMARY
Discharge Summary       PATIENT ID: Ayo Saini  MRN: [id number]   YOB: 1995    DATE OF ADMISSION: 9/29/2018  9:07 AM    DATE OF DISCHARGE: 9/30/2018  PRIMARY CARE PROVIDER: Ria Crockett MD     DISCHARGING PROVIDER: Chris Ceballos MD    To contact this individual call [phone] and ask the  to page. If unavailable ask to be transferred the Adult Hospitalist Department. CONSULTATIONS: IP CONSULT TO NEUROLOGY    PROCEDURES/SURGERIES: * No surgery found *    ADMITTING [address] [place] [address] COURSE:     Ayo Saini is a 21 y.o. male  with spastic CP, h/o UGIB, depression, and alcohol abuse who presents with acute onset L sided weakness. He states he was playing video games ysterday evening when he suddenly felt a L facial droop, and LUE weakness. He ignored the symtoms and continued playing. Later that evening he began having a headache with photophobia and phonophobia. His headache and weakness have now resolved. 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 After he told her how he was feeling last night, she told him to come to the ED for evaluation. Per the patient he had a similar event occur when he was in the seventh grade, and was told he had a complex migraine. He does not take any medication. He should walk with a walker but doesn't like too, and states he has been falling and knows he probably should. Denies any visual changes, no hearing changes, no HA currently, no trouble swallowing, no N/V/D/C, no abdominal pain, no MSK pain, no weakness currently, no numbness or tingling. Drinks about 6 beers per day, and does state he has withdrawal symptoms. Last drink was 2 nights ago. DISCHARGE DIAGNOSES / PLAN:      Weakness and Headache - consistent with complex migraine headache. Has now resolved.    - Neurology consulted - started Verapamil  - MRI as an outpatient  - daily aspirin  - Neuro follow-up     Spastic CP - with frequent falls  - PT without recs     H/o acohol abuse  - was on CIWA  - now out of window for severe withdrawal  - alcohol cessation counseling given       PENDING TEST RESULTS:   At the time of discharge the following test results are still pending: None    FOLLOW UP APPOINTMENTS:    Follow-up Information     Follow up With Details Comments [address], MD Schedule an appointment as soon as possible for a visit  [facility]  [facility] [address] [facility]      Mahi Aguilar MD Schedule an appointment as soon as possible for a visit  [address] [place] [address]  [phone]           ADDITIONAL CARE RECOMMENDATIONS:     DIET: Regular Diet    ACTIVITY: Activity as tolerated    DISCHARGE MEDICATIONS:  Current Discharge Medication List      START taking these medications    Details   aspirin 81 mg chewable tablet Take 1 Tab by mouth daily. Qty: 30 Tab, Refills: 0      verapamil ER (CALAN-SR) 120 mg tablet Take 1 Tab by mouth daily. Qty: 30 Tab, Refills: 0             NOTIFY YOUR PHYSICIAN FOR ANY OF THE FOLLOWING:   Fever over 101 degrees for 24 hours. Chest pain, shortness of breath, fever, chills, nausea, vomiting, diarrhea, change in mentation, falling, weakness, bleeding. Severe pain or pain not relieved by medications. Or, any other signs or symptoms that you may have questions about.     DISPOSITION:    Home With:   OT  PT  HH  RN       Long term SNF/Inpatient Rehab    Independent/assisted living    Hospice    Other:       PATIENT CONDITION AT DISCHARGE:     Functional status    Poor     Deconditioned     Independent      Cognition     Lucid     Forgetful     Dementia      Catheters/lines (plus indication)    Crowder     PICC     PEG     None      Code status     Full code     DNR      PHYSICAL EXAMINATION AT DISCHARGE:    General appearance: alert, cooperative, no distress, appears stated age  Eyes: conjunctivae/corneas clear. PERRL  Throat: Lips, mucosa, and tongue normal. Poor dentition  Neck: supple, symmetrical, trachea midline  Lungs: clear to auscultation bilaterally  Heart: regular rate and rhythm, S1, S2 normal, no murmur  Abdomen: soft, non-tender.  Bowel sounds normal. No masses,  no organomegaly  Extremities: extremities normal, atraumatic, no cyanosis or edema  Pulses: 2+ and symmetric  Skin: Skin color, texture, turgor normal. No rashes or lesions    CHRONIC MEDICAL DIAGNOSES:  Problem List as of 9/30/2018  Never Reviewed          Codes Class Noted - Resolved    RESOLVED: Weakness ICD-10-CM: R53.1  ICD-9-CM: 780.79  9/29/2018 - 9/30/2018            Greater than 30 minutes were spent with the patient on counseling and coordination of care    Signed:   Rashmi Vegas MD  9/30/2018  1:40 PM

## 2018-09-30 NOTE — CONSULTS
Neuro consult completed. Dictated note to follow. Pt with complicated migraine headaches since age 15yo, total of 6 including this one. Discussed diagnosis and increased risk of migraine related stroke with complicated migraine headaches. Exam with bilateral end gaze nystagmus, bilateral dysmetria with FTN, poor motor control in all extremities - mild, ataxic/spastic gait. -Recommend out pt MRI brain w/wo to exclude structural cause since MRI brain was not done on this admission.    -Recommend starting Verapamil ER 120mg qhs for headache prevention.    -Start EC ASA 81mg daily for prevention of migraine related stroke. -Pt instructed to use Ibuprofen or Tylenol for abortive therapy at onset of his numbness/weakness  -He should avoid triptans or ergotamines due to increased risk of stroke due to complicated migraines and because he smokes. -F/u with me in clinic - HIS APPOINTMENT WILL BE IN 3 MONTHS most likely and that is fine. He has my card to call with any questions and I will order his MRI brain to be done in the interim. 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    -Pt is encouraged to wean off alcohol for many reasons, but in particular due to added toxicity to his cerebellum. He is encourage to d/w his PCP, he may need help doing this given h/o alcoholism in his mother.

## 2018-09-30 NOTE — PROGRESS NOTES
Problem: Falls - Risk of 
Goal: *Absence of Falls Document Rita Harley Fall Risk and appropriate interventions in the flowsheet. Outcome: Progressing Towards Goal 
Fall Risk Interventions: 
Mobility Interventions: Communicate number of staff needed for ambulation/transfer, Patient to call before getting OOB, PT Consult for mobility concerns

## 2018-09-30 NOTE — PROGRESS NOTES
Problem: Mobility Impaired (Adult and Pediatric) Goal: *Acute Goals and Plan of Care (Insert Text) Physical Therapy Goals Initiated 9/30/2018 1. Patient will move from supine to sit and sit to supine  in bed with modified independence within 7 day(s). 2.  Patient will transfer from bed to chair and chair to bed with modified independence using the least restrictive device within 7 day(s). 3.  Patient will perform sit to stand with modified independence within 7 day(s). 4.  Patient will ambulate with modified independence for 300 feet with the least restrictive device within 7 day(s). 5.  Patient will ascend/descend 4 stairs with one handrail(s) with modified independence within 7 day(s). physical Therapy EVALUATION- neuro population Patient: Abdulkadir Espinoza (42 y.o. male) Date: 9/30/2018 Primary Diagnosis: Weakness Precautions: fall risk ASSESSMENT : 
Based on the objective data described below, the patient presents with impaired balance at baseline, decreased functional mobility due to being in bed since admission. Pt with CP and premorbid gait dysfunction but has managed independently. He did require intermittent CGA while ambulating. Pt with flat foot contact and self admitted tight heel cords. Is looking into Botox and may look into  AFOs at some point per pt report. 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 Patient will benefit from skilled intervention to address the above impairments. Patients rehabilitation potential is considered to be Good Factors which may influence rehabilitation potential include:  
[x]           None noted 
[]           Mental ability/status []           Medical condition 
[]           Home/family situation and support systems 
[]           Safety awareness 
[]           Pain tolerance/management 
[]           Other: PLAN : 
Recommendations and Planned Interventions: []             Bed Mobility Training             [x]      Neuromuscular Re-Education []             Transfer Training                   []      Orthotic/Prosthetic Training 
[]             Gait Training                         []      Modalities [x]             Therapeutic Exercises           []      Edema Management/Control [x]             Therapeutic Activities            []      Patient and Family Training/Education []             Other (comment): Frequency/Duration: Patient will be followed by physical therapy 5 times a week to address goals. Discharge Recommendations: None Further Equipment Recommendations for Discharge: none SUBJECTIVE:  
Patient stated I am ready to go home.  OBJECTIVE DATA SUMMARY:  
HISTORY:   
Past Medical History:  
Diagnosis Date  Alcohol use  Cerebral palsy ([facility] [address]  Depression  GI bleed Past Surgical History:  
Procedure Laterality Date  HX TONSILLECTOMY Prior Level of Function/Home Situation: Independent, no AD Personal factors and/or comorbidities impacting plan of care: pt has managed his CP Home Situation Home Environment: Apartment # Steps to Enter: 0 One/Two Story Residence: One story Living Alone: Yes Support Systems: Friends \ neighbors Patient [facility]s to be [facility] Used/Available at Home: Walker, rolling Tub or Shower Type: Shower EXAMINATION/PRESENTATION/DECISION MAKING:  
Critical Behavior: 
Neurologic State: Alert Orientation Level: Appropriate for age, Oriented X4 Cognition: Appropriate decision making Hearing: Auditory Auditory Impairment: None Skin:  intact Edema: none Range Of Motion: 
  
  
 hips and heel cords are restricted Shoulders-full AROM Strength:   
  
 grossly 4/5 Tone & Sensation:  
  
  
 intact Functional Mobility: 
Bed Mobility: 
  
  
  
Scooting: Modified independent Transfers: Sit to Stand: Modified independent Stand to Sit: Modified independent Stand Pivot Transfers: Modified independent Bed to Chair: Modified independent Balance:  
Sitting: Intact Standing: Impaired Ambulation/Gait Training: 
Distance (ft): 300 Feet (ft) Ambulation - Level of Assistance: Contact guard assistance (intermittent CGA) Gait Description (WDL): Exceptions to [facility] Functional Measure Riddle Balance Test: 
 
Sitting to Standing: 3 Standing Unsupported: 4 Sitting with Back Unsupported: 4 Standing to Sittin Transfers: 4 Standing Unsupported with Eyes Closed: 3 Standing Unsupported with Feet Together: 3 Reach Forward with Outstretched Arm: 4  Object: 3 Turn to Look Over Shoulders: 3 Turn 360 Degrees: 2 Alternate Foot on Step/Stool: 1 Standing Unsupported One Foot in Front: 2 Stand on One Le Total: 41 
  
 
 
56=Maximum possible score;  
0-20=High fall risk 21-40=Moderate fall risk 41-56=Low fall risk Riddle Balance Test and G-code impairment scale: 
Percentage of Impairment CH 
 
0% 
 CI 
 
1-19% CJ 
 
20-39% CK 
 
40-59% CL 
 
60-79% CM 
 
80-99% CN  
 
100% [facility] Score 0-56 56 45-55 34-44 23-33 12-22 1-11 0  
 
 
G codes: In compliance with CMSs Claims Based Outcome Reporting, the following G-code set was chosen for this patient based on their primary functional limitation being treated: The outcome measure chosen to determine the severity of the functional limitation was the BBS with a score of 41/56 which was correlated with the impairment scale. ? Mobility - Walking and Moving Around:  
  - CURRENT STATUS: CJ - 20%-39% impaired, limited or restricted  - GOAL STATUS: CI - 1%-19% impaired, limited or restricted  - D/C STATUS:  ---------------To be determined--------------- Physical Therapy Evaluation Charge Determination History Examination Presentation Decision-Making LOW Complexity : Zero comorbidities / personal factors that will impact the outcome / POC LOW Complexity : 1-2 Standardized tests and measures addressing body structure, function, activity limitation and / or participation in recreation  LOW Complexity : Stable, uncomplicated  LOW Complexity : FOTO score of  Based on the above components, the patient evaluation is determined to be of the following complexity level: LOW Therapeutic Exercises:  
 
Pain: 
Pain Scale 1: Numeric (0 - 10) Pain Intensity 1: 0 Activity Tolerance:  
good Please refer to the flowsheet for vital signs taken during this treatment. After treatment:  
[]     Patient left in no apparent distress sitting up in chair 
[x]     Patient left in no apparent distress in bed 
[x]     Call bell left within reach 
[]     Nursing notified 
[]     Caregiver present 
[]     Bed alarm activated COMMUNICATION/EDUCATION:  
The patients plan of care was discussed with: Registered Nurse. Patient and/or family was verbally educated on the BE FAST acronym for signs/symptoms of CVA and TIA. BE FAST was written on patient's communication board  for visual education and reinforcement. All questions answered with patient indicating goof understanding. [x]  Fall prevention education was provided and the patient/caregiver indicated understanding. []  Patient/family have participated as able in goal setting and plan of care. []  Patient/family agree to work toward stated goals and plan of care. []  Patient understands intent and goals of therapy, but is neutral about his/her participation. []  Patient is unable to participate in goal setting and plan of care. Thank you for this referral. 
Melia Smith, PT Time Calculation: 25 mins

## 2018-10-01 ENCOUNTER — HOSPITAL ENCOUNTER (EMERGENCY)
Age: 23
Discharge: STILL ADMITTED OR ADMINISTRATIVE | End: 2018-10-01
Attending: EMERGENCY MEDICINE
Payer: SELF-PAY

## 2018-10-01 DIAGNOSIS — R20.0 LEFT SIDED NUMBNESS: Primary | ICD-10-CM

## 2018-10-01 DIAGNOSIS — R53.1 LEFT-SIDED WEAKNESS: ICD-10-CM

## 2018-10-01 PROCEDURE — 75810000275 HC EMERGENCY DEPT VISIT NO LEVEL OF CARE
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
## 2019-05-23 ENCOUNTER — TELEPHONE (OUTPATIENT)
Dept: NEUROLOGY | Age: 24
End: 2019-05-23

## 2019-05-23 NOTE — TELEPHONE ENCOUNTER
Tried calling pt to get more information. His mailbox is full. Looking at your note from 2018 it appears he has migraines. Since we have not seen him in office does he have to see you, or can he be put in with Fidel?

## 2019-05-23 NOTE — TELEPHONE ENCOUNTER
Message from Landy below, okay for patient to see Johnson Perry?    ----- Message from [facility] sent at 5/23/2019  8:45 AM EDT -----  Regarding: Dr. Laurie Andrade: [phone]  Pt saw Dr. Leonela Banks in the hospital in 2018 and now he states that his symptoms are getting worse and he would like to see her at the clinic?

## 2019-06-19 ENCOUNTER — TELEPHONE (OUTPATIENT)
Dept: NEUROLOGY | Age: 24
End: 2019-06-19

## 2019-06-20 ENCOUNTER — OFFICE VISIT (OUTPATIENT)
Dept: NEUROLOGY | Age: 24
End: 2019-06-20

## 2019-06-20 VITALS
HEART RATE: 87 BPM | DIASTOLIC BLOOD PRESSURE: 80 MMHG | RESPIRATION RATE: 18 BRPM | HEIGHT: 71 IN | OXYGEN SATURATION: 97 % | WEIGHT: 152.8 LBS | SYSTOLIC BLOOD PRESSURE: 132 MMHG | BODY MASS INDEX: 21.39 KG/M2

## 2019-06-20 DIAGNOSIS — R26.9 GAIT DISORDER: ICD-10-CM

## 2019-06-20 DIAGNOSIS — G11.9 CEREBELLAR ATAXIA (HCC): Primary | ICD-10-CM

## 2019-06-20 DIAGNOSIS — R29.6 FREQUENT FALLS: ICD-10-CM

## 2019-06-20 RX ORDER — ESCITALOPRAM OXALATE 10 MG/1
TABLET ORAL
Refills: 1 | COMMUNITY
Start: 2019-05-23

## 2019-06-20 NOTE — LETTER
7/24/19 Patient: Brad Camarena YOB: 1995 Date of Visit: 6/20/2019 Kane Ventura, [address] [place] [id number] VIA Facsimile: [phone] Dear Kane Ventura MD, Thank you for referring Mr. Brad Camarena to [address] [place] [address] for evaluation. My notes for this consultation are attached. If you have questions, please do not hesitate to call me. I look forward to following your patient along with you. Sincerely, Jose Antonio Chambers MD

## 2019-06-20 NOTE — PATIENT INSTRUCTIONS
[facility]   Statement to Patients  April 1, 2014      In an effort to ensure the large volume of patient prescription refills is processed in the most efficient and expeditious manner, we are asking our patients to assist us by calling your Pharmacy for all prescription refills, this will include also your  Mail Order Pharmacy. The pharmacy will contact our office electronically to continue the refill process. Please do not wait until the last minute to call your pharmacy. We need at least 48 hours (2days) to fill prescriptions. We also encourage you to call your pharmacy before going to  your prescription to make sure it is ready. With regard to controlled substance prescription refill requests (narcotic refills) that need to be picked up at our office, we ask your cooperation by providing us with at least 72 hours (3days) notice that you will need a refill. We will not refill narcotic prescription refill requests after 4:00pm on any weekday, Monday through Thursday, or after 2:00pm on Fridays, or on the weekends. We encourage everyone to explore another way of getting your prescription refill request processed using [employer], our patient web portal through our electronic medical record system. [employer] is an efficient and effective way to communicate your medication request directly to the office and  downloadable as an soledad on your smart phone . [employer] also features a review functionality that allows you to view your medication list as well as leave messages for your physician. Are you ready to get connected? If so please review the attatched instructions or speak to any of our staff to get you set up right away! Thank you so much for your cooperation. Should you have any questions please contact our Practice Administrator.     The Physicians and Staff,  [facility]

## 2019-06-26 ENCOUNTER — TELEPHONE (OUTPATIENT)
Dept: NEUROLOGY | Age: 24
End: 2019-06-26

## 2019-06-26 NOTE — TELEPHONE ENCOUNTER
[facility] calling, they said they still haven't received the pt 's referral. Fax: [phone]

## 2019-07-07 ENCOUNTER — HOSPITAL ENCOUNTER (OUTPATIENT)
Dept: MRI IMAGING | Age: 24
Discharge: HOME | End: 2019-07-07
Attending: PSYCHIATRY & NEUROLOGY
Payer: COMMERCIAL

## 2019-07-07 DIAGNOSIS — R26.9 GAIT DISORDER: ICD-10-CM

## 2019-07-07 DIAGNOSIS — G11.9 CEREBELLAR ATAXIA (HCC): ICD-10-CM

## 2019-07-07 DIAGNOSIS — R29.6 FREQUENT FALLS: ICD-10-CM

## 2019-07-07 PROCEDURE — 70551 MRI BRAIN STEM W/O DYE: CPT

## 2019-07-09 ENCOUNTER — DOCUMENTATION ONLY (OUTPATIENT)
Dept: NEUROLOGY | Age: 24
End: 2019-07-09

## 2019-07-24 ENCOUNTER — TELEPHONE (OUTPATIENT)
Dept: NEUROLOGY | Age: 24
End: 2019-07-24

## 2019-07-24 NOTE — TELEPHONE ENCOUNTER
----- Message from Espiridion Raw sent at 7/24/2019  3:44 PM EDT -----  Regarding: Dr. Immanuel Alcazar  Pt returning a missed call. Best contact [phone].

## 2019-07-24 NOTE — PROGRESS NOTES
Jakob Ha - Please call pt: MRI brain wo contrast with only mild cerebellar vermis hypoplasia/atrophy which could be c/w his known medical history. Have we received the records from the specialist that he saw in [facility] about his cerebellar dysfunction? If not, please ask him for these records again.

## 2019-07-25 NOTE — TELEPHONE ENCOUNTER
----- Message from Cristy Menezes sent at 7/25/2019  8:45 AM EDT -----  Regarding: Dr. Jazlyn Flores Telephone  Patient returning a call.  Contact is [phone]

## 2019-11-08 ENCOUNTER — OFFICE VISIT (OUTPATIENT)
Dept: NEUROLOGY | Age: 24
End: 2019-11-08

## 2019-11-08 VITALS
BODY MASS INDEX: 21.84 KG/M2 | SYSTOLIC BLOOD PRESSURE: 118 MMHG | DIASTOLIC BLOOD PRESSURE: 70 MMHG | RESPIRATION RATE: 16 BRPM | OXYGEN SATURATION: 97 % | HEIGHT: 71 IN | HEART RATE: 76 BPM | WEIGHT: 156 LBS

## 2019-11-08 DIAGNOSIS — G43.109 COMPLICATED MIGRAINE: ICD-10-CM

## 2019-11-08 DIAGNOSIS — G11.9 CEREBELLAR ATAXIA (HCC): Primary | ICD-10-CM

## 2019-11-08 DIAGNOSIS — R29.6 FREQUENT FALLS: ICD-10-CM

## 2019-11-08 DIAGNOSIS — R26.9 GAIT DISORDER: ICD-10-CM

## 2019-11-08 NOTE — PATIENT INSTRUCTIONS
[facility]   Statement to Patients  April 1, 2014      In an effort to ensure the large volume of patient prescription refills is processed in the most efficient and expeditious manner, we are asking our patients to assist us by calling your Pharmacy for all prescription refills, this will include also your  Mail Order Pharmacy. The pharmacy will contact our office electronically to continue the refill process. Please do not wait until the last minute to call your pharmacy. We need at least 48 hours (2days) to fill prescriptions. We also encourage you to call your pharmacy before going to  your prescription to make sure it is ready. With regard to controlled substance prescription refill requests (narcotic refills) that need to be picked up at our office, we ask your cooperation by providing us with at least 72 hours (3days) notice that you will need a refill. We will not refill narcotic prescription refill requests after 4:00pm on any weekday, Monday through Thursday, or after 2:00pm on Fridays, or on the weekends. We encourage everyone to explore another way of getting your prescription refill request processed using [employer], our patient web portal through our electronic medical record system. [employer] is an efficient and effective way to communicate your medication request directly to the office and  downloadable as an soledad on your smart phone . [employer] also features a review functionality that allows you to view your medication list as well as leave messages for your physician. Are you ready to get connected? If so please review the attatched instructions or speak to any of our staff to get you set up right away! Thank you so much for your cooperation. Should you have any questions please contact our Practice Administrator.     The Physicians and Staff,  [facility]

## 2019-11-08 NOTE — PROGRESS NOTES
. Lila Delgadillo presents today to follow up ataxia and falls. Patient reports two recent falls. Depression screening done on patient.

## 2019-11-08 NOTE — PROGRESS NOTES
Neurology Consult Note      HISTORY PROVIDED BY: patient and Mom    Chief Complaint:   Chief Complaint   Patient presents with    Neurologic Problem      Subjective:   Pt is a 25 y.o. right-handed male last seen in clinic on 6/20/19 in f/u for congenital ataxia and history of complicated migraines beginning at age 17yo, hospitalized in Sept, 2018 with his typical complicated migraine, had left facial numbness and weakness and left arm and leg numbness, followed by a HA associated with photophobia and phonophobia. This was his 6th episode. He was started on MHA prevention med and ASA due to increased risk of migraine related stroke, but he was non-compliant with recommendations. Pt c/o worsened imbalance/motor control with falls. Exam with mild dysarthria, bilateral end gaze nystagmus, bilateral dysmetria with FTN, poor motor control in all extremities that is mild, and an ataxic or spastic gait. We again discussed the diagnosis of complex migraine and the increased risk for migraine related strokes. He declined tx at the time. -Recommended MRI of the brain with and without contrast to exclude structural cause.    -Continued ibuprofen or Tylenol for abortive therapy at the onset of his numbness and weakness.    -Recommended he avoid triptans or ergotamines due to the increased risk of stroke with complicated migraines  -Recommended PT for imbalance and gait  -Requested records from Specialist in [facility] who evaluated him for congenital ataxia    He returns for f/u. MRI brain wo contrast 7/7/19 with only mild cerebellar vermis hypoplasia/atrophy which could be c/w his known medical history. I still have no records from the specialist in [facility], Dr. Doris Sierra. His mother reports that he was told it was ataxia and did not know the cause, but did not feel it was progressive and wanted him to f/u annually. He did have genetic testing for Friedriech ataxia that was negative.  She recalls that he was pulling himself up at 6 months, but would immediately fall over. Mom notes that he was really sick when he was born, jaundiced, stomach issues, and asthma. She took him to multiple physicians as a child, but no diagnosis made. Did have MRIs done and may have shown stable issue. Recalls that he had a NCS/EMG and the nerves in his legs were \"not good. \"  This study was done in [place], [place]. He had an abnormal sleep study, woke up frequently. Also suspected nocturnal seizures causing nocturia and night terrors. Never treated with AEDs. He did go to PT at [facility], states he wasn't able to continue for \"some personal reasons. \"  They did dry needling in calves, hamstrings, quads, low back, glutes and mentioned botox to him. He felt less spastic and has better range of motion. He has continued to do the exercises they gave him along with his lifting workout, focusing on core muscles, feels more stable. C/o arthritic pain since the weather has gotten colder. When asked about migraines, states he has been completely sober for the last two months. Has moved back to [place] to live with his mother. He has had MHAs since middle school with left sided weakness, no family h/o MHAs. Denies any since last visit. Kamar Horses at night in legs improved after dry needling. Occ has lightheadedness.     Past Medical History:   Diagnosis Date    ADD (attention deficit disorder)     Alcohol use     Anxiety     Complicated migraine     Congenital nonprogressive ataxia (HCC)     Depression     GI bleed       Past Surgical History:   Procedure Laterality Date    HX ORTHOPAEDIC      HX TONSILLECTOMY        Social History     Socioeconomic History    Marital status: SINGLE     Spouse name: Not on file    Number of children: Not on file    Years of education: Not on file    Highest education level: Not on file   Occupational History    Occupation: Not currently working   Social Needs    Financial resource strain: Not on file    Food insecurity:     Worry: Not on file     Inability: Not on file    Transportation needs:     Medical: Not on file     Non-medical: Not on file   Tobacco Use    Smoking status: Former Smoker     Last attempt to quit: 2018     Years since quittin.7    Smokeless tobacco: Never Used   Substance and Sexual Activity    Alcohol use: Not Currently     Alcohol/week: 7.0 standard drinks     Types: 7 Cans of beer per week    Drug use: Not Currently     Types: Marijuana    Sexual activity: Not on file   Lifestyle    Physical activity:     Days per week: Not on file     Minutes per session: Not on file    Stress: Not on file   Relationships    Social connections:     Talks on phone: Not on file     Gets together: Not on file     Attends Sabianism service: Not on file     Active member of club or organization: Not on file     Attends meetings of clubs or organizations: Not on file     Relationship status: Not on file    Intimate partner violence:     Fear of current or ex partner: Not on file     Emotionally abused: Not on file     Physically abused: Not on file     Forced sexual activity: Not on file   Other Topics Concern    Not on file   Social History Narrative    Lives in [place] with his mother     Family History   Problem Relation Age of Onset    Hypertension Mother     Alcohol abuse Mother     Heart Disease Mother     Migraines Mother     Neuropathy Mother          Objective:   ROS: Per HPI o/w reviewed and negative. No Known Allergies     Meds:  Outpatient Medications Prior to Visit   Medication Sig Dispense Refill    escitalopram oxalate (LEXAPRO) 10 mg tablet TAKE 1 TABLET BY MOUTH EVERY DAY  1     No facility-administered medications prior to visit.         Imaging:  MRI Results (most recent):  Results from Hospital Encounter encounter on 19   MRI BRAIN WO CONT    Narrative EXAM: MRI BRAIN WO CONT    INDICATION: Pt with cerebellar ataxia, gait disturbance, and probable  complicated migraines    COMPARISON: CT head 9/29/2018. CONTRAST: None. TECHNIQUE:    Multiplanar multisequence acquisition without contrast of the brain. FINDINGS:  There is mild atrophy/hypoplasia of the superior cerebellar vermis (series 2  image 15). The ventricles are normal in size and position. The brain parenchyma has normal  signal characteristics. There is no acute infarct, hemorrhage, extra-axial fluid  collection, or mass effect. There is no cerebellar tonsillar herniation. Expected arterial flow-voids are present. Mild scattered mucosal thickening in the bilateral ethmoidal air cells, sphenoid  sinuses, and left maxillary sinus without air-fluid level. The mastoid air cells  and middle ears are clear. The orbital contents are within normal limits. No  significant osseous or scalp lesions are identified. Impression IMPRESSION:     1. Mild atrophy/hypoplasia of the superior cerebellar vermis. 2. Otherwise no intracranial abnormality. CT Results (most recent):  Results from Hospital Encounter encounter on 09/29/18   CT HEAD W CONT    Narrative EXAM:  CT HEAD W CONT    INDICATION:   Facial muscle weakness/paralysis    COMPARISON: MRV brain 9/29/2018. CONTRAST: 100 mL of Isovue-370. TECHNIQUE: CT of the head was performed following uneventful rapid bolus  intravenous administration of contrast.  Brain and bone windows were generated. CT dose reduction was achieved through use of a standardized protocol tailored  for this examination and automatic exposure control for dose modulation. FINDINGS:  The ventricles are normal in size and position. Basilar cisterns are patent. No  midline shift. There is no evidence of acute infarct, hemorrhage, or extraaxial  fluid collection. No evidence of abnormal enhancement. The dural venous sinuses  are patent. Right dominant transverse sinus. The paranasal sinuses, mastoid air cells, and middle ears are clear.  The orbital  contents are within normal limits. There are no significant osseous or  extracranial soft tissue lesions. Impression IMPRESSION:  1. No evidence of dural venous sinus thrombosis. Right dominant transverse  sinus. 2. No evidence of intracranial abnormality. Reviewed records in [employer] [facility] tab today    Lab Review   Results for orders placed or performed during the hospital encounter of 09/29/18   CBC WITH AUTOMATED DIFF   Result Value Ref Range    WBC 5.7 4.1 - 11.1 K/uL    RBC 5.19 4. 10 - 5.70 M/uL    HGB 15.5 12.1 - 17.0 g/dL    HCT 45.4 36.6 - 50.3 %    MCV 87.5 80.0 - 99.0 FL    MCH 29.9 26.0 - 34.0 PG    MCHC 34.1 30.0 - 36.5 g/dL    RDW 11.9 11.5 - 14.5 %    PLATELET 673 801 - 076 K/uL    MPV 9.2 8.9 - 12.9 FL    NRBC 0.0 0  WBC    ABSOLUTE NRBC 0.00 0.00 - 0.01 K/uL    NEUTROPHILS 59 32 - 75 %    LYMPHOCYTES 26 12 - 49 %    MONOCYTES 11 5 - 13 %    EOSINOPHILS 4 0 - 7 %    BASOPHILS 0 0 - 1 %    IMMATURE GRANULOCYTES 1 (H) 0.0 - 0.5 %    ABS. NEUTROPHILS 3.3 1.8 - 8.0 K/UL    ABS. LYMPHOCYTES 1.5 0.8 - 3.5 K/UL    ABS. MONOCYTES 0.6 0.0 - 1.0 K/UL    ABS. EOSINOPHILS 0.2 0.0 - 0.4 K/UL    ABS. BASOPHILS 0.0 0.0 - 0.1 K/UL    ABS. IMM. GRANS. 0.0 0.00 - 0.04 K/UL    DF AUTOMATED     METABOLIC PANEL, COMPREHENSIVE   Result Value Ref Range    Sodium 142 136 - 145 mmol/L    Potassium 3.7 3.5 - 5.1 mmol/L    Chloride 106 97 - 108 mmol/L    CO2 29 21 - 32 mmol/L    Anion gap 7 5 - 15 mmol/L    Glucose 103 (H) 65 - 100 mg/dL    BUN 16 6 - 20 MG/DL    Creatinine 0.95 0.70 - 1.30 MG/DL    BUN/Creatinine ratio 17 12 - 20      GFR est AA >60 >60 ml/min/1.73m2    GFR est non-AA >60 >60 ml/min/1.73m2    Calcium 8.9 8.5 - 10.1 MG/DL    Bilirubin, total 0.6 0.2 - 1.0 MG/DL    ALT (SGPT) 33 12 - 78 U/L    AST (SGOT) 18 15 - 37 U/L    Alk.  phosphatase 78 45 - 117 U/L    Protein, total 8.2 6.4 - 8.2 g/dL    Albumin 4.5 3.5 - 5.0 g/dL    Globulin 3.7 2.0 - 4.0 g/dL    A-G Ratio 1.2 1.1 - 2. 2     PROTHROMBIN TIME + INR   Result Value Ref Range    INR 1.0 0.9 - 1.1      Prothrombin time 9.8 9.0 - 11.1 sec   SAMPLES BEING HELD   Result Value Ref Range    SAMPLES BEING HELD 1RED     COMMENT        Add-on orders for these samples will be processed based on acceptable specimen integrity and analyte stability, which may vary by analyte. METABOLIC PANEL, BASIC   Result Value Ref Range    Sodium 140 136 - 145 mmol/L    Potassium 3.6 3.5 - 5.1 mmol/L    Chloride 104 97 - 108 mmol/L    CO2 25 21 - 32 mmol/L    Anion gap 11 5 - 15 mmol/L    Glucose 99 65 - 100 mg/dL    BUN 12 6 - 20 MG/DL    Creatinine 0.82 0.70 - 1.30 MG/DL    BUN/Creatinine ratio 15 12 - 20      GFR est AA >60 >60 ml/min/1.73m2    GFR est non-AA >60 >60 ml/min/1.73m2    Calcium 8.6 8.5 - 10.1 MG/DL   PHOSPHORUS   Result Value Ref Range    Phosphorus 4.0 2.6 - 4.7 MG/DL   MAGNESIUM   Result Value Ref Range    Magnesium 2.0 1.6 - 2.4 mg/dL   GLUCOSE, POC   Result Value Ref Range    Glucose (POC) 106 (H) 65 - 100 mg/dL    Performed by [facility]    EKG, 12 LEAD, INITIAL   Result Value Ref Range    Ventricular Rate 88 BPM    Atrial Rate 88 BPM    P-R Interval 108 ms    QRS Duration 104 ms    Q-T Interval 330 ms    QTC Calculation (Bezet) 399 ms    Calculated P Axis 49 degrees    Calculated R Axis 84 degrees    Calculated T Axis 43 degrees    Diagnosis       Sinus rhythm with sinus arrhythmia with short NC  No previous ECGs available  Confirmed by Lucinda Yip M.D., Vikas Naylor ([id number]) on 9/29/2018 11:42:48 AM          Exam:  Visit Vitals  /70   Pulse 76   Resp 16   Ht 5' 11\" (1.803 m)   Wt 70.8 kg (156 lb)   SpO2 97%   BMI 21.76 kg/m²     General:  Alert, cooperative, no distress. Head:  Normocephalic, without obvious abnormality, atraumatic. Respiratory:  Heart:   Non labored breathing  Regular rate and rhythm, no murmurs   Neck:      Extremities: Warm, no cyanosis or edema. Pulses: 2+ radial pulses.        Neurologic:  MS: Alert and oriented x 4, speech intact. Language intact. Attention and fund of knowledge appropriate. Recent and remote memory intact. Cranial Nerves:  II: visual fields    II: pupils    II: optic disc    III,VII: ptosis none   III,IV,VI: extraocular muscles  EOM are dysconjugate, no diplopia,  nystagmus with left lateral gaze   V: facial light touch sensation     VII: facial muscle function   symmetric   VIII: hearing intact   IX: soft palate elevation     XI: trapezius strength     XI: sternocleidomastoid strength    XII: tongue       Motor: No tremor, Strength: 5/5 throughout, but with poor motor control, decreased bulk in LE  Sensory:  Coordination: FTN with dysmetria, HTS intact  Gait: Ataxic/spastic gait, unsteady, but improved  Reflexes: 2+ symmetric           Assessment/Plan   Pt is a 25 y.o. right-handed male with congenital ataxia, history of complicated migraines beginning at age 15yo, and reported peripheral neuropathy, with previous extensive work up for ataxia and NCS/EMG confirming PN. Testing for Friedreich ataxia reportedly normal.  He was hospitalized in Sept, 2018 with his typical complicated migraine, had left facial numbness and weakness and left arm and leg numbness, followed by a HA associated with photophobia and phonophobia, non-compliant with MHA prevention med and ASA for prevention of migraine related stroke. Pt has had improvement in imbalance/motor control after PT at [facility] with dry needling, working on core muscle strengthening, and after becoming sober. Exam with mild dysarthria, dysconjugate eye movements with left lateral end gaze nystagmus, bilateral dysmetria with FTN, poor motor control in all extremities that is mild, and an ataxic or spastic gait.   MRI brain wo contrast 7/7/19 with only mild cerebellar vermis hypoplasia/atrophy which could be c/w his known medical history.  -Referral to Dr. Fidel Carlisle at the [facility], Neurology [address] [place] [address] where he was seen previously. -Requested records including NCS/EMG results from Neurologist in [facility]  -Discussed use of a muscle relaxer and balance between too much tone and not enough tone, will hold off on starting a medication for now.   -Continued ibuprofen or Tylenol for abortive therapy at the onset of his numbness and weakness.    -Recommended he avoid triptans or ergotamines due to the increased risk of stroke with complicated migraines  -F/u in clinic in 6 months, instructed to call in the interim if needed. ICD-10-CM ICD-9-CM    1. Cerebellar ataxia (HCC) G11.9 334.3 REFERRAL TO NEUROLOGY   2. Gait disorder R26.9 781.2    3. Frequent falls R29.6 V15.88    4. Complicated migraine E06.635 346.00        Signed:   German Torres MD  11/8/2019

## 2019-11-08 NOTE — LETTER
11/27/19 Patient: Pedro Lr YOB: 1995 Date of Visit: 11/8/2019 Aicha Dos Santos, [address] [facility] [place] [id number] VIA Facsimile: [phone] Dear Aicha Dos Santos MD, Thank you for referring Mr. Pedro Lr to [address] [place] [address] for evaluation. My notes for this consultation are attached. If you have questions, please do not hesitate to call me. I look forward to following your patient along with you. Sincerely, Santiago Kan MD

## 2019-11-18 ENCOUNTER — TELEPHONE (OUTPATIENT)
Dept: NEUROLOGY | Age: 24
End: 2019-11-18

## 2019-11-18 NOTE — TELEPHONE ENCOUNTER
I called Shanna back and verified patient's information and she is unable to find records on this patient.

## 2019-11-18 NOTE — TELEPHONE ENCOUNTER
Shanna with [facility] received request for medical records from Dr. Jack Mitchell. They are unable to find patient.  Please call back to clarify request.

## 2019-11-20 ENCOUNTER — TELEPHONE (OUTPATIENT)
Dept: NEUROLOGY | Age: 24
End: 2019-11-20

## 2019-11-20 NOTE — TELEPHONE ENCOUNTER
I advised patient that I tried to obtain these records and they did not have him as a patient. He stated that he was a patient there and he will pursue obtaining these records.

## 2019-11-29 ENCOUNTER — DOCUMENTATION ONLY (OUTPATIENT)
Dept: NEUROLOGY | Age: 24
End: 2019-11-29